# Patient Record
Sex: FEMALE | Race: WHITE | Employment: FULL TIME | URBAN - METROPOLITAN AREA
[De-identification: names, ages, dates, MRNs, and addresses within clinical notes are randomized per-mention and may not be internally consistent; named-entity substitution may affect disease eponyms.]

---

## 2020-07-01 ENCOUNTER — APPOINTMENT (OUTPATIENT)
Dept: ULTRASOUND IMAGING | Age: 35
End: 2020-07-01
Payer: COMMERCIAL

## 2020-07-01 ENCOUNTER — HOSPITAL ENCOUNTER (EMERGENCY)
Age: 35
Discharge: HOME | End: 2020-07-01
Attending: EMERGENCY MEDICINE
Payer: COMMERCIAL

## 2020-07-01 VITALS
TEMPERATURE: 98.9 F | OXYGEN SATURATION: 100 % | RESPIRATION RATE: 16 BRPM | HEART RATE: 68 BPM | SYSTOLIC BLOOD PRESSURE: 126 MMHG | DIASTOLIC BLOOD PRESSURE: 79 MMHG | WEIGHT: 122.3 LBS

## 2020-07-01 LAB
A/G RATIO: 1.6 (ref 1.1–2.2)
ABO/RH: NORMAL
ALBUMIN SERPL-MCNC: 4.6 G/DL (ref 3.4–5)
ALP BLD-CCNC: 48 U/L (ref 40–129)
ALT SERPL-CCNC: 18 U/L (ref 10–40)
ANION GAP SERPL CALCULATED.3IONS-SCNC: 13 MMOL/L (ref 3–16)
APTT: 31.6 SEC (ref 24.2–36.2)
AST SERPL-CCNC: 24 U/L (ref 15–37)
BACTERIA: ABNORMAL /HPF
BASOPHILS ABSOLUTE: 0.1 K/UL (ref 0–0.2)
BASOPHILS RELATIVE PERCENT: 1.3 %
BILIRUB SERPL-MCNC: <0.2 MG/DL (ref 0–1)
BILIRUBIN URINE: NEGATIVE
BLOOD, URINE: ABNORMAL
BUN BLDV-MCNC: 10 MG/DL (ref 7–20)
CALCIUM SERPL-MCNC: 9.5 MG/DL (ref 8.3–10.6)
CHLORIDE BLD-SCNC: 104 MMOL/L (ref 99–110)
CLARITY: CLEAR
CO2: 24 MMOL/L (ref 21–32)
COLOR: YELLOW
CREAT SERPL-MCNC: <0.5 MG/DL (ref 0.6–1.1)
EOSINOPHILS ABSOLUTE: 0.1 K/UL (ref 0–0.6)
EOSINOPHILS RELATIVE PERCENT: 2 %
EPITHELIAL CELLS, UA: ABNORMAL /HPF (ref 0–5)
GFR AFRICAN AMERICAN: >60
GFR NON-AFRICAN AMERICAN: >60
GLOBULIN: 2.9 G/DL
GLUCOSE BLD-MCNC: 99 MG/DL (ref 70–99)
GLUCOSE URINE: NEGATIVE MG/DL
GONADOTROPIN, CHORIONIC (HCG) QUANT: 6.2 MIU/ML
HCT VFR BLD CALC: 36.3 % (ref 36–48)
HEMOGLOBIN: 12.4 G/DL (ref 12–16)
INR BLD: 1 (ref 0.86–1.14)
KETONES, URINE: NEGATIVE MG/DL
LEUKOCYTE ESTERASE, URINE: NEGATIVE
LYMPHOCYTES ABSOLUTE: 1.9 K/UL (ref 1–5.1)
LYMPHOCYTES RELATIVE PERCENT: 42.9 %
MCH RBC QN AUTO: 30 PG (ref 26–34)
MCHC RBC AUTO-ENTMCNC: 34.2 G/DL (ref 31–36)
MCV RBC AUTO: 87.8 FL (ref 80–100)
MICROSCOPIC EXAMINATION: YES
MONOCYTES ABSOLUTE: 0.3 K/UL (ref 0–1.3)
MONOCYTES RELATIVE PERCENT: 6.8 %
MUCUS: ABNORMAL /LPF
NEUTROPHILS ABSOLUTE: 2.1 K/UL (ref 1.7–7.7)
NEUTROPHILS RELATIVE PERCENT: 47 %
NITRITE, URINE: NEGATIVE
PDW BLD-RTO: 12.7 % (ref 12.4–15.4)
PH UA: 6 (ref 5–8)
PLATELET # BLD: 168 K/UL (ref 135–450)
PMV BLD AUTO: 10.2 FL (ref 5–10.5)
POTASSIUM REFLEX MAGNESIUM: 3.8 MMOL/L (ref 3.5–5.1)
PROTEIN UA: NEGATIVE MG/DL
PROTHROMBIN TIME: 11.6 SEC (ref 10–13.2)
RBC # BLD: 4.13 M/UL (ref 4–5.2)
RBC UA: ABNORMAL /HPF (ref 0–4)
RHIG LOT NUMBER: NORMAL
SODIUM BLD-SCNC: 141 MMOL/L (ref 136–145)
SPECIFIC GRAVITY UA: 1.01 (ref 1–1.03)
TOTAL PROTEIN: 7.5 G/DL (ref 6.4–8.2)
URINE REFLEX TO CULTURE: ABNORMAL
URINE TYPE: ABNORMAL
UROBILINOGEN, URINE: 0.2 E.U./DL
WBC # BLD: 4.5 K/UL (ref 4–11)
WBC UA: ABNORMAL /HPF (ref 0–5)

## 2020-07-01 PROCEDURE — 80053 COMPREHEN METABOLIC PANEL: CPT

## 2020-07-01 PROCEDURE — 85730 THROMBOPLASTIN TIME PARTIAL: CPT

## 2020-07-01 PROCEDURE — 85610 PROTHROMBIN TIME: CPT

## 2020-07-01 PROCEDURE — 36415 COLL VENOUS BLD VENIPUNCTURE: CPT

## 2020-07-01 PROCEDURE — 84702 CHORIONIC GONADOTROPIN TEST: CPT

## 2020-07-01 PROCEDURE — 6360000002 HC RX W HCPCS: Performed by: EMERGENCY MEDICINE

## 2020-07-01 PROCEDURE — 86901 BLOOD TYPING SEROLOGIC RH(D): CPT

## 2020-07-01 PROCEDURE — 96372 THER/PROPH/DIAG INJ SC/IM: CPT

## 2020-07-01 PROCEDURE — 85025 COMPLETE CBC W/AUTO DIFF WBC: CPT

## 2020-07-01 PROCEDURE — 86900 BLOOD TYPING SEROLOGIC ABO: CPT

## 2020-07-01 PROCEDURE — 76801 OB US < 14 WKS SINGLE FETUS: CPT

## 2020-07-01 PROCEDURE — 81001 URINALYSIS AUTO W/SCOPE: CPT

## 2020-07-01 PROCEDURE — 99284 EMERGENCY DEPT VISIT MOD MDM: CPT

## 2020-07-01 RX ADMIN — HUMAN RHO(D) IMMUNE GLOBULIN 300 MCG: 300 INJECTION, SOLUTION INTRAMUSCULAR at 21:46

## 2020-07-01 ASSESSMENT — PAIN SCALES - GENERAL: PAINLEVEL_OUTOF10: 4

## 2020-07-01 ASSESSMENT — PAIN DESCRIPTION - PAIN TYPE: TYPE: ACUTE PAIN

## 2020-07-01 ASSESSMENT — PAIN DESCRIPTION - LOCATION: LOCATION: ABDOMEN

## 2020-07-01 NOTE — ED PROVIDER NOTES
CHIEF COMPLAINT  Vaginal Bleeding      HISTORY OF PRESENT ILLNESS  Radha Suazo is a 38 y.o. female, who presents to the ED with crampy lower abdominal pain and vaginal bleeding. Patient had onset of a small quantity of dark red vaginal bleeding associated with 3- 4/10 crampy suprapubic pain not associated with nausea, vomiting, flank pain, dysuria, back pain, lightheadedness, dizziness chest pain, shortness of breath,. Patient states her last period was May 26, 2020. She has taken 2 home pregnancy test which were positive. Patient is G0, P0. Patient has no known COVID exposure. Patient does not complain of sore throat, nasal congestion, earache, loss of taste or smell, chest pain, cough, dyspnea, diarrhea, myalgias. Review of Systems    I have reviewed the following from the nursing documentation. History reviewed. No pertinent past medical history. History reviewed. No pertinent surgical history. History reviewed. No pertinent family history.   Social History     Socioeconomic History    Marital status: Not on file     Spouse name: Not on file    Number of children: Not on file    Years of education: Not on file    Highest education level: Not on file   Occupational History    Not on file   Social Needs    Financial resource strain: Not on file    Food insecurity     Worry: Not on file     Inability: Not on file    Transportation needs     Medical: Not on file     Non-medical: Not on file   Tobacco Use    Smoking status: Never Smoker    Smokeless tobacco: Never Used   Substance and Sexual Activity    Alcohol use: Not Currently    Drug use: Never    Sexual activity: Not on file   Lifestyle    Physical activity     Days per week: Not on file     Minutes per session: Not on file    Stress: Not on file   Relationships    Social connections     Talks on phone: Not on file     Gets together: Not on file     Attends Hindu service: Not on file     Active member of club or organization: Not on file     Attends meetings of clubs or organizations: Not on file     Relationship status: Not on file    Intimate partner violence     Fear of current or ex partner: Not on file     Emotionally abused: Not on file     Physically abused: Not on file     Forced sexual activity: Not on file   Other Topics Concern    Not on file   Social History Narrative    Not on file     No current facility-administered medications for this encounter. Current Outpatient Medications   Medication Sig Dispense Refill    Prenatal MV-Min-Fe Fum-FA-DHA (PRENATAL 1 PO) Take by mouth       No Known Allergies       PHYSICAL EXAM  /69   Pulse 71   Temp 98.9 °F (37.2 °C) (Oral)   Resp 16   Wt 55.5 kg (122 lb 4.8 oz)   LMP 05/26/2020   SpO2 100%   Physical Exam   GENERAL APPEARANCE: Awake and alert. Cooperative. In no acute distress. EYES: PERRL. Corneas clear. Sclera non icteric. No conjunctival injection  ENT: Oropharynx clear. Airway patent. No stridor. No asymmetry. NECK: Supple  LUNGS: Clear. Equal breath sounds bilaterally. CARDIOVASCULAR: RRR. No murmurs rubs or gallops. ABDOMEN/: Soft non tender. No guarding or rebound. Pelvic examination: Speculum examination shows moderate amount of dark blood in vaginal vault with dark blood issuing from a small, nulliparous cervical loss. Unable to assess for opening of the cervical os due to its small size. Bimanual examination shows slightly enlarged but nontender uterus no adnexal masses or tenderness  EXTREMITIES:  Moves all extremities equally. SKIN: Warm and dry.     LABORATORY STUDIES:   Labs Reviewed   URINE RT REFLEX TO CULTURE - Abnormal; Notable for the following components:       Result Value    Blood, Urine LARGE (*)     All other components within normal limits    Narrative:     Performed at:  [facility]) - [facility]  Radha Howell,  [address]   Phone [phone]   MICROSCOPIC URINALYSIS - Abnormal; Notable for the following components:    Mucus, UA Rare (*)     RBC, UA 11-20 (*)     Bacteria, UA Rare (*)     All other components within normal limits    Narrative:     Performed at:  [facility]  [facility] [address],  [place] [address]   Phone [phone]   CBC WITH AUTO DIFFERENTIAL    Narrative:     Performed at:  [address] [place] [address]   Phone [phone]    Narrative:     Performed at:  [place] [address],  [place] [address]   Phone [phone]   APTT    Narrative:     Performed at:  [place] [address],  [place] [address]   Phone [phone]   COMPREHENSIVE METABOLIC PANEL W/ REFLEX TO MG FOR LOW K   HCG, QUANTITATIVE, PREGNANCY        RADIOLOGY  No orders to display       If EKG done, EKG was interpreted independently by me    PROCEDURES  Procedures    EDCOURSE/MDM  Patient seen and evaluated. Old records selectively reviewed if pertinent. Labs and imaging reviewed and results discussed with patient. I considered normal pregnancy, ectopic pregnancy, threatened . I spoke with Dr. Phani Jacobs at the [facility] ED. He has accepted the patient for transfer and further evaluation. Patient will be transported by private vehicle directly and immediately to the [facility] ED. If Lonza Bernheim is discharged, I discussed with Lonza Bernheim and/or family the exam results, diagnosis, care, prognosis, reasons to return and the importance of follow up. Patient and/or family is in agreement with plan and all questions have been answered. Specific discharge instructions explained, including reasons to return to the emergency department. If discharged, patient was given scripts for the following medications.     New Prescriptions    No medications on file       CLINICAL IMPRESSION  1. Bleeding in early pregnancy    2. Abdominal pain during pregnancy in first trimester        /69   Pulse 71   Temp 98.9 °F (37.2 °C) (Oral)   Resp 16   Wt 55.5 kg (122 lb 4.8 oz)   LMP 05/26/2020   SpO2 100%     DISPOSITION  Jessica Leyva was transferred by private vehicle to the [facility]. for further evaluation and management in stable condition.                    Alyce Curling, MD  07/01/20 [id number]       Alyce Curling, MD  07/01/20 2009       Alyce Curling, MD  07/02/20 [id number]

## 2020-07-01 NOTE — ED PROVIDER NOTES
[address] [facility]          ATTENDING PHYSICIAN NOTE       Date of evaluation: 7/1/2020    Chief Complaint     Vaginal Bleeding      History of Present Illness     Hazel Antoine is a 38 y.o. female G1 at approximately 5 weeks gestation presenting for vaginal bleeding. Patient was transferred from [facility] ED for ultrasound due to concern for possible miscarriage or ectopic pregnancy. hCG was noted to be 6. Has been having bleeding since early this morning, some small clot passage, no definitive tissue passage. No significant abdominal pain, vomiting, fevers, dysuria. Review of Systems     Pertinent positive and negative findings as documented in the HPI. Otherwise all other systems were reviewed and were negative. Physical Exam     INITIAL VITALS: BP: 116/76, Temp: 98.9 °F (37.2 °C), Pulse: 80, Resp: 15, SpO2: 100 %     Nursing note and vitals reviewed. General:  Adult female, alert and appropriately interactive. In no distress. HENT: Normocephalic and atraumatic. External ears normal. Nose appears normal externally. Eyes: Conjunctivae normal. No scleral icterus. Neck: Neck supple. No tracheal deviation present. CV: Normal rate. Regular rhythm. Chest: Effort normal on room air. Abdominal: Soft. No distension. No tenderness. No rebound or guarding. No masses. No peritoneal signs. : Deferred  Musculoskeletal: No edema. No gross deformities. Neurological: Alert and appropriately interactive. Face symmetric, speech without dysarthria or obvious aphasia. Moving all extremities spontaneously. Skin: Warm, dry. No rash. No diaphoresis or erythema. Psychiatric: Calm and cooperative with appropriate mood and affect. Procedures   Procedures    MEDICAL DECISION MAKING     MDM: Hazel Antoine is a 38 y.o. female G1 at approximately 5 weeks gestation presenting for vaginal bleeding. On arrival, patient is well-appearing, vital signs reassuring.   Abdominal exam is benign.  exam deferred as was previously performed at the outside ED. ABO/Rh was sent and patient was found to be Rh-.  Transvaginal ultrasound performed and demonstrated no intrauterine gestation or products of conception. No evidence of ectopic pregnancy and patient's clinical examination does not fit with ectopic at this time. Findings all suggestive of missed . Patient administered RhoGam.  Advised close follow-up with OB to ensure ongoing improvement. Was planning to establish care with a local clinic, she will call tomorrow to expedite an appointment for follow-up with referral information provided here. All questions answered to her and her partner satisfaction. Discharged in stable condition with written and verbal instructions for which to return to the ED. Clinical Impression     1. Miscarriage    2. Bleeding in early pregnancy    3. Abdominal pain during pregnancy in first trimester        Disposition     DISPOSITION Decision To Discharge 2020 09:43:13 PM        Lexy Brand MD  10:07 PM                     Past Medical, Surgical, Family, and Social History     She has no past medical history on file. She has no past surgical history on file. Her family history is not on file. She reports that she has never smoked. She has never used smokeless tobacco. She reports previous alcohol use. She reports that she does not use drugs. Medications     Discharge Medication List as of 2020  9:40 PM      CONTINUE these medications which have NOT CHANGED    Details   Prenatal MV-Min-Fe Fum-FA-DHA (PRENATAL 1 PO) Take by mouthHistorical Med             Allergies     She has No Known Allergies. ED Course     Nursing Notes, Past Medical Hx, Past Surgical Hx, Social Hx,Allergies, and Family Hx were reviewed.     Patient was given the following medications:  Orders Placed This Encounter   Medications    rho(D) immune globulin (HYPERRHO S/D) injection 300 mcg Diagnostic Results       RECENT VITALS:  BP: 126/79,Temp: 98.9 °F (37.2 °C), Pulse: 68, Resp: 16, SpO2: 100 %     RADIOLOGY:  US OB LESS THAN 14 WEEKS SINGLE OR FIRST GESTATION   Final Result      No intrauterine pregnancy identified      Dominant exophytic subserosal fibroid noted. In light of a positive beta hCG. The findings may relate to nonvisualized ectopic pregnancy, missed AB or early intrauterine pregnancy. Correlation with clinical history and laboratory values with follow-up beta-hCG recommended.           LABS:   Results for orders placed or performed during the hospital encounter of 07/01/20   CBC Auto Differential   Result Value Ref Range    WBC 4.5 4.0 - 11.0 K/uL    RBC 4.13 4.00 - 5.20 M/uL    Hemoglobin 12.4 12.0 - 16.0 g/dL    Hematocrit 36.3 36.0 - 48.0 %    MCV 87.8 80.0 - 100.0 fL    MCH 30.0 26.0 - 34.0 pg    MCHC 34.2 31.0 - 36.0 g/dL    RDW 12.7 12.4 - 15.4 %    Platelets 647 744 - 796 K/uL    MPV 10.2 5.0 - 10.5 fL    Neutrophils % 47.0 %    Lymphocytes % 42.9 %    Monocytes % 6.8 %    Eosinophils % 2.0 %    Basophils % 1.3 %    Neutrophils Absolute 2.1 1.7 - 7.7 K/uL    Lymphocytes Absolute 1.9 1.0 - 5.1 K/uL    Monocytes Absolute 0.3 0.0 - 1.3 K/uL    Eosinophils Absolute 0.1 0.0 - 0.6 K/uL    Basophils Absolute 0.1 0.0 - 0.2 K/uL   Comprehensive Metabolic Panel w/ Reflex to MG   Result Value Ref Range    Sodium 141 136 - 145 mmol/L    Potassium reflex Magnesium 3.8 3.5 - 5.1 mmol/L    Chloride 104 99 - 110 mmol/L    CO2 24 21 - 32 mmol/L    Anion Gap 13 3 - 16    Glucose 99 70 - 99 mg/dL    BUN 10 7 - 20 mg/dL    CREATININE <0.5 (L) 0.6 - 1.1 mg/dL    GFR Non-African American >60 >60    GFR African American >60 >60    Calcium 9.5 8.3 - 10.6 mg/dL    Total Protein 7.5 6.4 - 8.2 g/dL    Alb 4.6 3.4 - 5.0 g/dL    Albumin/Globulin Ratio 1.6 1.1 - 2.2    Total Bilirubin <0.2 0.0 - 1.0 mg/dL    Alkaline Phosphatase 48 40 - 129 U/L    ALT 18 10 - 40 U/L    AST 24 15 - 37 U/L Globulin 2.9 g/dL   Protime-INR   Result Value Ref Range    Protime 11.6 10.0 - 13.2 sec    INR 1.00 0.86 - 1.14   APTT   Result Value Ref Range    aPTT 31.6 24.2 - 36.2 sec   HCG, Quantitative, Pregnancy   Result Value Ref Range    hCG Quant 6.2 <5.0 mIU/mL   Urinalysis Reflex to Culture   Result Value Ref Range    Color, UA Yellow Straw/Yellow    Clarity, UA Clear Clear    Glucose, Ur Negative Negative mg/dL    Bilirubin Urine Negative Negative    Ketones, Urine Negative Negative mg/dL    Specific Gravity, UA 1.015 1.005 - 1.030    Blood, Urine LARGE (A) Negative    pH, UA 6.0 5.0 - 8.0    Protein, UA Negative Negative mg/dL    Urobilinogen, Urine 0.2 <2.0 E.U./dL    Nitrite, Urine Negative Negative    Leukocyte Esterase, Urine Negative Negative    Microscopic Examination YES     Urine Type NotGiven     Urine Reflex to Culture Not Indicated    Microscopic Urinalysis   Result Value Ref Range    Mucus, UA Rare (A) None Seen /LPF    WBC, UA 0-2 0 - 5 /HPF    RBC, UA 11-20 (A) 0 - 4 /HPF    Epithelial Cells, UA 0-1 0 - 5 /HPF    Bacteria, UA Rare (A) None Seen /HPF   ABO/RH   Result Value Ref Range    ABO/Rh A NEG    RHOGAM INJECTION ONLY   Result Value Ref Range    RHIG LOT NUMBER       RhImmuneGlobulin    [id number]          issued       1 vial  07/01/20 21:31       CONSULTS:  None    PATIENT REFERRED TO:  [address] [place] [address] [address] [place] [address] Road.   [address] [place] [id number]  [phone]  Go to   Immediately    [facility] Emergency Department  [address] [address] [address]  [address]  [phone]    If symptoms worsen    Carlitos Torres MD  [address] [facility] [address]  [phone]    Schedule an appointment as soon as possible for a visit   or call [phone] to [facility] [place]      DISCHARGE MEDICATIONS:  Discharge Medication List as of 7/1/2020  9:40 PM           Dary Grullon MD  07/01/20 [id number]